# Patient Record
Sex: FEMALE
[De-identification: names, ages, dates, MRNs, and addresses within clinical notes are randomized per-mention and may not be internally consistent; named-entity substitution may affect disease eponyms.]

---

## 2017-12-31 ENCOUNTER — HEALTH MAINTENANCE LETTER (OUTPATIENT)
Age: 27
End: 2017-12-31

## 2018-06-11 ENCOUNTER — OFFICE VISIT - HEALTHEAST (OUTPATIENT)
Dept: FAMILY MEDICINE | Facility: CLINIC | Age: 28
End: 2018-06-11

## 2018-06-11 ENCOUNTER — COMMUNICATION - HEALTHEAST (OUTPATIENT)
Dept: SCHEDULING | Facility: CLINIC | Age: 28
End: 2018-06-11

## 2018-06-11 DIAGNOSIS — L50.9 HIVES: ICD-10-CM

## 2018-06-11 DIAGNOSIS — L50.9 URTICARIA: ICD-10-CM

## 2018-06-11 ASSESSMENT — MIFFLIN-ST. JEOR: SCORE: 1141.34

## 2018-06-12 ENCOUNTER — OFFICE VISIT - HEALTHEAST (OUTPATIENT)
Dept: ALLERGY | Facility: CLINIC | Age: 28
End: 2018-06-12

## 2018-06-12 DIAGNOSIS — L50.9 URTICARIA: ICD-10-CM

## 2018-06-12 ASSESSMENT — MIFFLIN-ST. JEOR: SCORE: 1141.34

## 2018-12-05 ENCOUNTER — OFFICE VISIT (OUTPATIENT)
Dept: OTOLARYNGOLOGY | Facility: CLINIC | Age: 28
End: 2018-12-05
Payer: COMMERCIAL

## 2018-12-05 VITALS
BODY MASS INDEX: 20.77 KG/M2 | WEIGHT: 110 LBS | SYSTOLIC BLOOD PRESSURE: 108 MMHG | HEIGHT: 61 IN | HEART RATE: 103 BPM | DIASTOLIC BLOOD PRESSURE: 67 MMHG | OXYGEN SATURATION: 97 %

## 2018-12-05 DIAGNOSIS — R04.0 EPISTAXIS: Primary | ICD-10-CM

## 2018-12-05 ASSESSMENT — PAIN SCALES - GENERAL: PAINLEVEL: NO PAIN (0)

## 2018-12-05 NOTE — LETTER
12/5/2018       RE: Kavya Cordero  [address] [facility] [address]     Dear Colleague,    Thank you for referring your patient, Kavya Cordero, to the [facility] EAR [facility]SE [facility] THROAT at [facility]. Please see a copy of my visit note below.    Kavya was seen in past for nasal cauterization.  She has been bleeding again on the left.      Exam:  Bilateral caudal septum examined. Hypervascular area seen on left.     Procedure:  Silver nitrate cauterization performed on a limited area of the septum. Tolerated well.     A/P:  RTC as needed for nose bleeds.     Again, thank you for allowing me to participate in the care of your patient.      Sincerely,    Lidia Dias MD

## 2018-12-05 NOTE — NURSING NOTE
"Chief Complaint   Patient presents with     RECHECK     follow up epitaxis /cauterization      Blood pressure 108/67, pulse 103, height 1.55 m (5' 1.02\"), weight 49.9 kg (110 lb), SpO2 97 %.    Owen Alcala LPN    "

## 2018-12-05 NOTE — PATIENT INSTRUCTIONS
Thank you for choosing  [facility].  Please follow up with Dr. Dias as needed.    [phone] appointment scheduling option 1 and nurse advice option 3.

## 2018-12-05 NOTE — LETTER
Date:December 10, 2018      Patient was self referred, no letter generated. Do not send.        [facility] Information

## 2018-12-09 NOTE — PROGRESS NOTES
Kavya was seen in past for nasal cauterization.  She has been bleeding again on the left.      Exam:  Bilateral caudal septum examined. Hypervascular area seen on left.     Procedure:  Silver nitrate cauterization performed on a limited area of the septum. Tolerated well.     A/P:  RTC as needed for nose bleeds.

## 2020-03-10 ENCOUNTER — HEALTH MAINTENANCE LETTER (OUTPATIENT)
Age: 30
End: 2020-03-10

## 2020-12-27 ENCOUNTER — HEALTH MAINTENANCE LETTER (OUTPATIENT)
Age: 30
End: 2020-12-27

## 2021-02-02 ENCOUNTER — IMMUNIZATION (OUTPATIENT)
Dept: NURSING | Facility: CLINIC | Age: 31
End: 2021-02-02
Payer: COMMERCIAL

## 2021-02-02 PROCEDURE — 91300 PR COVID VAC PFIZER DIL RECON 30 MCG/0.3 ML IM: CPT

## 2021-02-02 PROCEDURE — 0001A PR COVID VAC PFIZER DIL RECON 30 MCG/0.3 ML IM: CPT

## 2021-02-23 ENCOUNTER — IMMUNIZATION (OUTPATIENT)
Dept: NURSING | Facility: CLINIC | Age: 31
End: 2021-02-23
Attending: FAMILY MEDICINE
Payer: COMMERCIAL

## 2021-02-23 PROCEDURE — 0002A PR COVID VAC PFIZER DIL RECON 30 MCG/0.3 ML IM: CPT

## 2021-02-23 PROCEDURE — 91300 PR COVID VAC PFIZER DIL RECON 30 MCG/0.3 ML IM: CPT

## 2021-04-24 ENCOUNTER — HEALTH MAINTENANCE LETTER (OUTPATIENT)
Age: 31
End: 2021-04-24

## 2021-07-15 VITALS — HEIGHT: 61 IN | BODY MASS INDEX: 20.77 KG/M2 | WEIGHT: 110 LBS

## 2021-07-15 NOTE — PROGRESS NOTES
Assessment:     1. Hives     2. Urticaria            Plan:     Discussed with patient in great detail the symptoms is consistent with an allergic reaction of unknown cause.  Since she was seen at the emergency room and emergency room, currently on treatment taking prednisone 60 mg daily.  She need to give medication some time or follow-up with her PCP.  She may also continue taking Benadryl as needed.  Use her EpiPen if she develops any shortness of breath if no response to the EpiPen she need to go to the emergency room.  Keep her appointment with the allergy specialist for further evaluation.  Patient and her  verbalized understanding the plan of care.    Subjective:       28 y.o. female presents for evaluation of ET Caria, an allergic reaction.  Patient was seen at the emergency room on Tina 10, 2018.  She was given a shot of prednisone and started on prednisone 60 mg daily.  Later that night patient reports that her symptoms got worse where she went to the ED she was given epi and Pepcid in the emergency department.  She was discharged home with Pepcid, but she continues having symptoms.  She reports that this afternoon she started swelling up again around the left side of her lip spreading to the eye, she took a second dose of prednisone 60 mg by her symptoms did not relieve between 330 to 4 PM.  She is here currently to be seen to see if she needed to change her medication or if there is anything further that could be found for her today.    The following portions of the patient's history were reviewed and updated as appropriate: allergies, current medications, past family history, past medical history, past social history, past surgical history and problem list.    Review of Systems  A 12 point comprehensive review of systems was negative except as noted.     Objective:      /60 (Patient Site: Right Arm, Patient Position: Sitting, Cuff Size: Adult Regular)  Pulse 90  Temp 97.9  F (36.6  C)  "(Axillary)   Resp 18  Ht 5' 1\" (1.549 m)  Wt 110 lb (49.9 kg)  SpO2 99%  Breastfeeding? Yes  BMI 20.78 kg/m2  General appearance: alert, appears stated age, cooperative and mild distress  Lungs: clear to auscultation bilaterally  Heart: regular rate and rhythm, S1, S2 normal, no murmur, click, rub or gallop  Skin: Skin color, texture, turgor normal. No rashes or lesions or generalized rash all over her body more prominent on the left side of her face.  no vesicles, no papules. Skin warm to touch, no erythema  Neurologic: Grossly normal     This note has been dictated using voice recognition software. Any grammatical or context distortions are unintentional and inherent to the software  "

## 2021-07-15 NOTE — PROGRESS NOTES
Chief complaint: Hives    History of present illness: This is a pleasant 28-year-old woman who is 10 weeks postpartum here today for evaluation of hives.  She states that Saturday morning she woke up and had hives.  She then went to her parents cabin.  She noted them initially on her neck but they spread to her stomach.  She then took 50 mg of Benadryl every 4-6 hours for 2 doses which seemed to help.  After the 4-6 hours, however, she would note that the hives would return.  On the third dose, her hives failed to resolve and she felt that she was having some respiratory symptoms.  She felt like she was breathing through a kinked hose.  She went to the emergency room was given triamcinolone cream and prednisone initially but then when she had the respiratory symptoms, she presented to the emergency room.  There she was given epinephrine, IV famotidine and Benadryl.  She is currently taking prednisone 60 mg, famotidine twice daily and Benadryl every 4-6 hours 25 mg.  With this, she continues to have some breakthrough hives.  At least a few episodes a day.  She can think of nothing unusual she did on Friday.  She is currently on maternity leave.  She states that she has restarted her oral contraceptives but stopped it as it was a different one that she was using previously.  She does not take any nonsteroidal anti-inflammatory drugs and denies illness.  No swelling of her lips or eyes.  She has never had a reaction like this before.  No history of eczema.  She does have a history of some nasal allergies.  She denies any nasal congestion or drainage.  No history of asthma.    Past medical history: Otherwise unremarkable    Social history: She is going back to work this next week after having her baby, this is her first delivery, she denies any changes at home, she does have pets, non-smoker    Family history: No history of asthma or allergies or hives to her knowledge    Review of Systems performed as above and the  "remainder is negative.      Current Outpatient Prescriptions:      docusate sodium (COLACE) 100 MG capsule, Take 100 mg by mouth daily., Disp: , Rfl:      EPINEPHrine (EPIPEN/ADRENACLICK) 0.3 mg/0.3 mL injection, Inject 0.3 mL (0.3 mg total) as directed as needed for anaphylaxis. Inject into thigh., Disp: 2 Pre-filled Pen Syringe, Rfl: 0     ferrous sulfate 325 (65 FE) MG tablet, Take 325 mg by mouth daily., Disp: , Rfl:      predniSONE (DELTASONE) 20 MG tablet, Take 60 mg by mouth., Disp: , Rfl:      sertraline (ZOLOFT) 50 MG tablet, TK 1 T PO  ONCE D., Disp: , Rfl: 3     triamcinolone (KENALOG) 0.1 % cream, , Disp: , Rfl: 0     predniSONE (DELTASONE) 20 MG tablet, 1 tab twice daily for 2 days, then 1 tab daily for 2 days, Disp: 6 tablet, Rfl: 0    Allergies   Allergen Reactions     Codeine Hives       Pulse (!) 106  Ht 5' 1\" (1.549 m)  Wt 110 lb (49.9 kg)  SpO2 99%  BMI 20.78 kg/m2  Gen: Pleasant female not in acute distressMouth: Throat clear, no lip or tongue edema.     HEENT: Eyes no erythema of the bulbar or palpebral conjunctiva, no edema.  Nose: No congestion  Skin: Hives on her left neck and one on her left arm    Psych: Alert and oriented times 3    Impression report and plan:  1.  Acute urticaria    I do not think she requires allergy testing at this time as there does not appear to be a specific allergic trigger.  I do suspect that being postpartum may have led to her hive outbreak.  I would like her to continue with the prednisone but taper this prednisone as she may have rebound hives when she has completed the 60 mg course.  She is currently nursing.  I would like her to use Claritin 10 mg twice daily as she is currently discarding her breast milk while she is on the prednisone.  When she is off prednisone, I would like her to go back to Claritin 10 mg daily.  Stop famotidine for now.  Benadryl only as needed.  Went over specific triggers for urticaria.  I stated that urticaria can come and go up " to 6 weeks after the initial reaction.  I think her breathing difficulty was likely vocal cord dysfunction.  She does have an epinephrine pen currently it was prescribed in the emergency room and she can carry this for now but likely in the future she will not need to do this.    Time spent with patient, 30 minutes, greater than half spent counseling and coordination of care regarding urticaria.

## 2021-10-04 ENCOUNTER — HEALTH MAINTENANCE LETTER (OUTPATIENT)
Age: 31
End: 2021-10-04

## 2022-05-15 ENCOUNTER — HEALTH MAINTENANCE LETTER (OUTPATIENT)
Age: 32
End: 2022-05-15

## 2022-09-11 ENCOUNTER — HEALTH MAINTENANCE LETTER (OUTPATIENT)
Age: 32
End: 2022-09-11

## 2023-06-03 ENCOUNTER — HEALTH MAINTENANCE LETTER (OUTPATIENT)
Age: 33
End: 2023-06-03

## 2024-07-15 ENCOUNTER — TRANSFERRED RECORDS (OUTPATIENT)
Dept: HEALTH INFORMATION MANAGEMENT | Facility: CLINIC | Age: 34
End: 2024-07-15
Payer: COMMERCIAL

## 2024-09-17 ENCOUNTER — TRANSFERRED RECORDS (OUTPATIENT)
Dept: HEALTH INFORMATION MANAGEMENT | Facility: CLINIC | Age: 34
End: 2024-09-17
Payer: COMMERCIAL